# Patient Record
Sex: FEMALE | Race: WHITE | Employment: UNEMPLOYED | URBAN - METROPOLITAN AREA
[De-identification: names, ages, dates, MRNs, and addresses within clinical notes are randomized per-mention and may not be internally consistent; named-entity substitution may affect disease eponyms.]

---

## 2021-04-06 ENCOUNTER — HOSPITAL ENCOUNTER (EMERGENCY)
Age: 36
Discharge: HOME | End: 2021-04-06
Attending: EMERGENCY MEDICINE

## 2021-04-06 ENCOUNTER — APPOINTMENT (OUTPATIENT)
Dept: GENERAL RADIOLOGY | Age: 36
End: 2021-04-06
Attending: EMERGENCY MEDICINE

## 2021-04-06 VITALS
OXYGEN SATURATION: 96 % | DIASTOLIC BLOOD PRESSURE: 97 MMHG | HEIGHT: 58 IN | TEMPERATURE: 99.6 F | BODY MASS INDEX: 41.98 KG/M2 | HEART RATE: 116 BPM | WEIGHT: 200 LBS | RESPIRATION RATE: 18 BRPM | SYSTOLIC BLOOD PRESSURE: 133 MMHG

## 2021-04-06 DIAGNOSIS — J20.8 ACUTE BRONCHITIS DUE TO OTHER SPECIFIED ORGANISMS: Primary | ICD-10-CM

## 2021-04-06 PROCEDURE — 99282 EMERGENCY DEPT VISIT SF MDM: CPT

## 2021-04-06 PROCEDURE — 71046 X-RAY EXAM CHEST 2 VIEWS: CPT

## 2021-04-06 RX ORDER — ALBUTEROL SULFATE 90 UG/1
2 AEROSOL, METERED RESPIRATORY (INHALATION)
Qty: 1 INHALER | Refills: 0 | Status: SHIPPED | OUTPATIENT
Start: 2021-04-06

## 2021-04-06 RX ORDER — AZITHROMYCIN 250 MG/1
TABLET, FILM COATED ORAL
Qty: 6 TAB | Refills: 0 | Status: SHIPPED | OUTPATIENT
Start: 2021-04-06

## 2021-04-06 NOTE — ED TRIAGE NOTES
Pt tested positive for Covid-19 on 3/28. States she has had a fever every night x 5 days with cough x 48 hrs. Concerned for pneumonia.

## 2021-04-07 NOTE — ED PROVIDER NOTES
57-year-old female presenting for persistent fever and cough. Diagnosed with Covid 10 days ago she thought she should be getting better by now  She had a fever almost every day for the past 5 days  Spoke with family about and they were concerned so thought she should come in to make sure this did not turn into bacterial pneumonia  She is somewhat winded when she does activities but otherwise denies shortness of breath. The history is provided by the patient. Fever   This is a new problem. The current episode started more than 2 days ago. The problem occurs daily. The problem has not changed since onset. The maximum temperature noted was 103 - 104 F. The temperature was taken using an oral thermometer. Associated symptoms include headaches, muscle aches, cough and shortness of breath. Pertinent negatives include no chest pain, no fussiness, no sleepiness, no diarrhea, no vomiting, no congestion, no sore throat, no mental status change and no neck pain. She has tried nothing for the symptoms. The treatment provided no relief. Cough  Associated symptoms include headaches and shortness of breath. Pertinent negatives include no chest pain, no sore throat and no vomiting. Positive For Covid-19  Max temp prior to arrival:  104  Temp source:  Oral  Severity:  Moderate  Onset quality:  Gradual  Duration:  5 days  Timing:  Intermittent  Progression:  Waxing and waning  Chronicity:  New  Relieved by:  Acetaminophen and ibuprofen  Worsened by:  Nothing  Ineffective treatments:  Electrolyte drinks  Associated symptoms: cough and headaches    Associated symptoms: no chest pain, no congestion, no diarrhea, no sore throat and no vomiting         Past Medical History:   Diagnosis Date    Diabetes ([facility] [address]      states not diabetic insulin resistant due to hormone imbalance       Past Surgical History:   Procedure Laterality Date    HX GYN      c-sect         No family history on file.     Social History     Socioeconomic History    Marital status: SINGLE     Spouse name: Not on file    Number of children: Not on file    Years of education: Not on file    Highest education level: Not on file   Occupational History    Not on file   Social Needs    Financial resource strain: Not on file    Food insecurity     Worry: Not on file     Inability: Not on file    Transportation needs     Medical: Not on file     Non-medical: Not on file   Tobacco Use    Smoking status: Never Smoker   Substance and Sexual Activity    Alcohol use: No    Drug use: No    Sexual activity: Not on file   Lifestyle    Physical activity     Days per week: Not on file     Minutes per session: Not on file    Stress: Not on file   Relationships    Social connections     Talks on phone: Not on file     Gets together: Not on file     Attends Bahai service: Not on file     Active member of club or organization: Not on file     Attends meetings of clubs or organizations: Not on file     Relationship status: Not on file    Intimate partner violence     Fear of current or ex partner: Not on file     Emotionally abused: Not on file     Physically abused: Not on file     Forced sexual activity: Not on file   Other Topics Concern    Not on file   Social History Narrative    Not on file         ALLERGIES: Patient has no known allergies. Review of Systems   Constitutional: Positive for fever. HENT: Negative for congestion and sore throat. Respiratory: Positive for cough and shortness of breath. Cardiovascular: Negative for chest pain. Gastrointestinal: Negative for diarrhea and vomiting. Musculoskeletal: Negative for neck pain. Neurological: Positive for headaches. All other systems reviewed and are negative. Vitals:    04/06/21 1733   BP: (!) 133/97   Pulse: (!) 116   Resp: 18   Temp: 99.6 °F (37.6 °C)   SpO2: 95%   Weight: 90.7 kg (200 lb)   Height: 4' 10\" (1.473 m)            Physical Exam  Vitals signs and nursing note reviewed. Constitutional:       Appearance: She is well-developed. HENT:      Head: Normocephalic and atraumatic. Eyes:      Conjunctiva/sclera: Conjunctivae normal.      Pupils: Pupils are equal, round, and reactive to light. Neck:      Musculoskeletal: Neck supple. Cardiovascular:      Rate and Rhythm: Normal rate and regular rhythm. Pulmonary:      Effort: Pulmonary effort is normal.      Breath sounds: Wheezing present. Abdominal:      General: Bowel sounds are normal.      Palpations: Abdomen is soft. Musculoskeletal: Normal range of motion. Skin:     General: Skin is warm and dry. Neurological:      Mental Status: She is alert and oriented to person, place, and time. MDM  Number of Diagnoses or Management Options  Diagnosis management comments: 40-year-old female presenting to verify she does not have bacterial pneumonia after 10 days of Covid and continued fevers       Amount and/or Complexity of Data Reviewed  Tests in the radiology section of CPT®: ordered and reviewed (Xr Chest Pa Lat    Result Date: 4/6/2021  EXAM: XR CHEST PA LAT INDICATION: cough, fever, Covid positive COMPARISON: None. FINDINGS: PA and lateral radiographs of the chest demonstrate clear lungs. The cardiac and mediastinal contours and pulmonary vascularity are normal. The bones and soft tissues are within normal limits. Normal chest.    )  Independent visualization of images, tracings, or specimens: yes    Risk of Complications, Morbidity, and/or Mortality  Presenting problems: moderate  Diagnostic procedures: moderate  Management options: moderate  General comments: I personally reviewed the patient's vital signs, laboratory tests, and/or radiological findings. I discussed these findings with the patient and their significance. I answered all questions and gave the patient clear return precautions.   The patient was discharged from the emergency department in stable condition        Patient Progress  Patient progress: improved         Procedures

## 2022-12-16 NOTE — DISCHARGE INSTRUCTIONS
Use medications as directed.   Continue Tylenol and Motrin for fever control [General Appearance - Alert] : alert [General Appearance - In No Acute Distress] : in no acute distress [Outer Ear] : the ears and nose were normal in appearance